# Patient Record
Sex: FEMALE | Race: WHITE
[De-identification: names, ages, dates, MRNs, and addresses within clinical notes are randomized per-mention and may not be internally consistent; named-entity substitution may affect disease eponyms.]

---

## 2017-08-02 ENCOUNTER — HOSPITAL ENCOUNTER (OUTPATIENT)
Dept: HOSPITAL 47 - RADMAMWWP | Age: 47
Discharge: HOME | End: 2017-08-02
Payer: COMMERCIAL

## 2017-08-02 DIAGNOSIS — Z12.31: Primary | ICD-10-CM

## 2017-08-02 PROCEDURE — 77063 BREAST TOMOSYNTHESIS BI: CPT

## 2017-08-03 NOTE — MM
Reason for exam: screening  (asymptomatic).

Last mammogram was performed 6 years and 1 month ago.



History:

Family history of breast cancer in mother at age 70.

Benign cyst aspiration, 2007.



Physical Findings:

A clinical breast exam by your physician is recommended on an annual basis and 

results should be correlated with mammographic findings.



MG 3D Screening Mammo W/Cad

Bilateral CC and MLO view(s) were taken.

Prior study comparison: June 24, 2011, bilateral digital screening mammo w/CAD.

The breast tissue is heterogeneously dense. This may lower the sensitivity of 

mammography.  There is a central new asymmetry in the right breast in comparison 

to the prior.





ASSESSMENT: Incomplete: need additional imaging evaluation, BI-RAD 0



RECOMMENDATION:

Special view mammogram of the right breast.





If lesion persists on supplemental views, image directed ultrasound is 

recommended.



[facility] will attempt to contact patient to return for supplemental 

views and ultrasound if indicated.

## 2017-08-10 ENCOUNTER — HOSPITAL ENCOUNTER (OUTPATIENT)
Dept: HOSPITAL 47 - RADMAMWWP | Age: 47
Discharge: HOME | End: 2017-08-10
Payer: COMMERCIAL

## 2017-08-10 DIAGNOSIS — R92.8: Primary | ICD-10-CM

## 2017-08-10 NOTE — MM
Reason for exam: additional evaluation requested from abnormal screening.

Last mammogram was performed less than 1 month ago.



History:

Family history of breast cancer in mother at age 70.

Benign cyst aspiration, 2007.



Physical Findings:

Nurse did not find any significant physical abnormalities on exam.



MG 3D Work Up W/Cad RT

ML and spot compression CC view(s) were taken of the right breast.

Prior study comparison: August 2, 2017, bilateral MG 3d screening mammo w/cad.  

June 24, 2011, bilateral digital screening mammo w/CAD.

The breast tissue is heterogeneously dense. This may lower the sensitivity of 

mammography.  No significant new findings when compared with previous films.



These results were verbally communicated with the patient and result sheet given 

to the patient on 8/10/17.





ASSESSMENT: Benign, BI-RAD 2



RECOMMENDATION:

Return to routine screening mammogram schedule for both breasts.

## 2018-12-27 ENCOUNTER — HOSPITAL ENCOUNTER (OUTPATIENT)
Dept: HOSPITAL 47 - RADMAMWWP | Age: 48
Discharge: HOME | End: 2018-12-27
Attending: FAMILY MEDICINE
Payer: COMMERCIAL

## 2018-12-27 DIAGNOSIS — Z12.31: Primary | ICD-10-CM

## 2018-12-27 PROCEDURE — 77067 SCR MAMMO BI INCL CAD: CPT

## 2018-12-27 PROCEDURE — 77063 BREAST TOMOSYNTHESIS BI: CPT

## 2018-12-31 NOTE — MM
Reason for exam: screening  (asymptomatic).

Last mammogram was performed 1 year and 5 months ago.



History:

Family history of breast cancer in mother at age 70.

Benign cyst aspiration, 2007.



Physical Findings:

A clinical breast exam by your physician is recommended on an annual basis and 

results should be correlated with mammographic findings.



MG 3D Screening Mammo W/Cad

Bilateral CC and MLO view(s) were taken.

Prior study comparison: August 10, 2017, right breast MG 3d work up w/cad RT.  

August 2, 2017, bilateral MG 3d screening mammo w/cad.

The breast tissue is heterogeneously dense. This may lower the sensitivity of 

mammography.  There is chronic nodularity in the right breast.  No significant 

changes when compared with prior studies.





ASSESSMENT: Benign, BI-RAD 2



RECOMMENDATION:

Routine screening mammogram of both breasts in 1 year.

## 2019-10-23 ENCOUNTER — HOSPITAL ENCOUNTER (OUTPATIENT)
Dept: HOSPITAL 47 - ORWHC2ENDO | Age: 49
Discharge: HOME | End: 2019-10-23
Attending: SURGERY
Payer: COMMERCIAL

## 2019-10-23 VITALS — HEART RATE: 59 BPM | DIASTOLIC BLOOD PRESSURE: 73 MMHG | SYSTOLIC BLOOD PRESSURE: 118 MMHG

## 2019-10-23 VITALS — TEMPERATURE: 97.7 F

## 2019-10-23 VITALS — RESPIRATION RATE: 16 BRPM

## 2019-10-23 DIAGNOSIS — Z88.1: ICD-10-CM

## 2019-10-23 DIAGNOSIS — Z88.0: ICD-10-CM

## 2019-10-23 DIAGNOSIS — F32.9: ICD-10-CM

## 2019-10-23 DIAGNOSIS — J30.2: ICD-10-CM

## 2019-10-23 DIAGNOSIS — Z88.2: ICD-10-CM

## 2019-10-23 DIAGNOSIS — Z12.11: Primary | ICD-10-CM

## 2019-10-23 DIAGNOSIS — Z98.890: ICD-10-CM

## 2019-10-23 DIAGNOSIS — Z79.899: ICD-10-CM

## 2019-10-23 NOTE — P.GSHP
History of Present Illness


H&P Date: 10/23/19


Chief Complaint: Colon cancer screening





48-year-old female here today for screening colonoscopy.  She has not had one 

previously.  Patient has no bowel related complaints.  No family history of 

colon cancer.





Past Medical History


Additional Past Medical History / Comment(s): SEASONAL ALLERGIES


History of Any Multi-Drug Resistant Organisms: None Reported


Past Surgical History: Hernia Repair


Additional Past Surgical History / Comment(s): LEFT HERNIA & UMBICIAL HERNIA ON 

1/23/19.  HERNIA REPAIR MANY YEARS AGO


Past Anesthesia/Blood Transfusion Reactions: No Reported Reaction, Postoperative

Nausea & Vomiting (PONV)


Additional Past Anesthesia/Blood Transfusion Reaction / Comment(s): DAD WAS 

SENSITIVE TO ANESTHESIA.


Past Psychological History: Depression


Smoking Status: Never smoker


Past Alcohol Use History: Rare


Past Drug Use History: None Reported





Medications and Allergies


                                Home Medications











 Medication  Instructions  Recorded  Confirmed  Type


 


Escitalopram Oxalate [Lexapro] 30 mg PO HS 01/21/19 10/23/19 History


 


Loratadine [Allergy] 10 mg PO HS 01/21/19 10/21/19 History








                                    Allergies











Allergy/AdvReac Type Severity Reaction Status Date / Time


 


erythromycin base Allergy  Unknown Verified 10/21/19 11:48


 


Penicillins Allergy  Unknown Verified 10/21/19 11:48


 


Sulfa (Sulfonamide Allergy  TROUBLE Verified 10/21/19 11:48





Antibiotics)   SWALLOWING  


 


sulfamethoxazole Allergy  TROUBLE Verified 10/21/19 11:48





[From Bactrim]   SWALLOWING  


 


trimethoprim [From Bactrim] Allergy  TROUBLE Verified 10/21/19 11:48





   SWALLOWING  














Surgical - Exam


                                   Vital Signs











Temp Pulse Resp BP Pulse Ox


 


 97.7 F   77   18   133/59   97 


 


 10/23/19 09:26  10/23/19 09:26  10/23/19 09:26  10/23/19 09:26  10/23/19 09:26

















Physical exam:


General: Well-developed, well-nourished


HEENT: Normocephalic, sclerae nonicteric


Abdomen: Nontender, nondistended


Extremities: No edema


Neuro: Alert and oriented





Assessment and Plan


(1) Colon cancer screening


Narrative/Plan: 


Will proceed with colonoscopy at this time.


Current Visit: Yes   Status: Acute   Code(s): Z12.11 - ENCOUNTER FOR SCREENING 

FOR MALIGNANT NEOPLASM OF COLON   SNOMED Code(s): 223129561

## 2019-10-23 NOTE — P.PCN
Date of Procedure: 10/23/19


Procedure(s) Performed: 


PREOPERATIVE DIAGNOSIS: Colon cancer screening


POSTOPERATIVE DIAGNOSIS: Normal exam


PROCEDURE: Colonoscopy 


ANESTHESIA: MAC


SURGEON: Chas Modi M.D.


SPECIMENS: None


ENDOSCOPIC PROCEDURE:  The patient was placed on the endoscopy table in the left

decubitus position.  The Olympus colonoscope was inserted into the anus and 

passed under direct visualization to the base of the cecum.  The appendiceal 

orifice was visualized.  From that point the scope was slowly withdrawn inspecti

ng all surfaces carefully.  There were no neoplastic inflammatory or polypoid 

lesions throughout the cecum, ascending, transverse, descending, sigmoid and 

rectum.  There was no visible diverticulosis noted.  Digital rectal examination 

was normal.  The patient was taken to the recovery room in stable condition per 

anesthesia guidelines.


RECOMMENDATIONS: Increase fiber.  Follow-up colonoscopy in 10 years.

## 2020-08-01 ENCOUNTER — HOSPITAL ENCOUNTER (OUTPATIENT)
Dept: HOSPITAL 47 - RADMRIMAIN | Age: 50
Discharge: HOME | End: 2020-08-01
Attending: ORTHOPAEDIC SURGERY
Payer: COMMERCIAL

## 2020-08-01 DIAGNOSIS — M94.261: Primary | ICD-10-CM

## 2020-08-01 DIAGNOSIS — M25.461: ICD-10-CM

## 2020-08-01 NOTE — MR
EXAMINATION TYPE: MR knee RT wo con

 

DATE OF EXAM: 8/1/2020 8:09 AM

 

COMPARISON: NONE

 

HISTORY: Rt knee pain

 

TECHNIQUE: Multiplanar, multiecho imaging of the right knee is performed without IV contrast.

 

FINDINGS: There is a small knee joint effusion. There is a grade I to II chondromalacia involving the
 medial patellar facet. There is a small amount of grade IV chondromalacia along the medial facet as 
well.

 

The grade I to II chondromalacia involving the weightbearing surface of the medial femoral condyle.

 

Both medial and lateral menisci appear intact.

 

The anterior and posterior cruciate ligaments are intact.

 

Both the medial and lateral collateral ligament complexes are intact. Iliotibial band inserts normall
y upon Gerdy's tubercle.

 

Both patellar and quadriceps tendons are intact. The popliteus tendon and muscle appear unremarkable.
 There is no significant swelling in the Hoffa fat pad. There is some pseudocystic change deep to the
 tibial tubercles.

 

IMPRESSION:  

1. CHONDROMALACIA AS DESCRIBED.

2. NO ACUTE LIGAMENTOUS OR MENISCAL INJURY.

3. SMALL KNEE JOINT EFFUSION.

## 2020-09-21 ENCOUNTER — HOSPITAL ENCOUNTER (OUTPATIENT)
Dept: HOSPITAL 47 - LABPAT | Age: 50
Discharge: HOME | End: 2020-09-21
Attending: ORTHOPAEDIC SURGERY
Payer: COMMERCIAL

## 2020-09-21 DIAGNOSIS — M23.91: ICD-10-CM

## 2020-09-21 DIAGNOSIS — Z01.818: Primary | ICD-10-CM

## 2020-09-21 LAB
ANION GAP SERPL CALC-SCNC: 6 MMOL/L
BASOPHILS # BLD AUTO: 0.1 K/UL (ref 0–0.2)
BASOPHILS NFR BLD AUTO: 1 %
CHLORIDE SERPL-SCNC: 104 MMOL/L (ref 98–107)
CO2 SERPL-SCNC: 27 MMOL/L (ref 22–30)
EOSINOPHIL # BLD AUTO: 0.1 K/UL (ref 0–0.7)
EOSINOPHIL NFR BLD AUTO: 1 %
ERYTHROCYTE [DISTWIDTH] IN BLOOD BY AUTOMATED COUNT: 4.63 M/UL (ref 3.8–5.4)
ERYTHROCYTE [DISTWIDTH] IN BLOOD: 13.9 % (ref 11.5–15.5)
HCT VFR BLD AUTO: 42.6 % (ref 34–46)
HGB BLD-MCNC: 13.7 GM/DL (ref 11.4–16)
LYMPHOCYTES # SPEC AUTO: 2.1 K/UL (ref 1–4.8)
LYMPHOCYTES NFR SPEC AUTO: 40 %
MCH RBC QN AUTO: 29.6 PG (ref 25–35)
MCHC RBC AUTO-ENTMCNC: 32.2 G/DL (ref 31–37)
MCV RBC AUTO: 92.1 FL (ref 80–100)
MONOCYTES # BLD AUTO: 0.3 K/UL (ref 0–1)
MONOCYTES NFR BLD AUTO: 6 %
NEUTROPHILS # BLD AUTO: 2.6 K/UL (ref 1.3–7.7)
NEUTROPHILS NFR BLD AUTO: 49 %
PLATELET # BLD AUTO: 273 K/UL (ref 150–450)
POTASSIUM SERPL-SCNC: 4.5 MMOL/L (ref 3.5–5.1)
SODIUM SERPL-SCNC: 137 MMOL/L (ref 137–145)
WBC # BLD AUTO: 5.2 K/UL (ref 3.8–10.6)

## 2020-09-21 PROCEDURE — 85025 COMPLETE CBC W/AUTO DIFF WBC: CPT

## 2020-09-21 PROCEDURE — 93005 ELECTROCARDIOGRAM TRACING: CPT

## 2020-09-21 PROCEDURE — 36415 COLL VENOUS BLD VENIPUNCTURE: CPT

## 2020-09-21 PROCEDURE — 80051 ELECTROLYTE PANEL: CPT

## 2020-09-24 NOTE — HP
HISTORY AND PHYSICAL



CHIEF COMPLAINT:

Right knee pain.



HISTORY OF PRESENT ILLNESS:

Patient is a 49-year-old  who presents with right knee pain for the

past several months.  She notes progressive pain with prolonged weightbearing.  She has

intermittent giving way.  She notes night symptoms.  She has been limping

intermittently.  She has tried medications and an injection with only partial temporary

relief.



PAST MEDICAL HISTORY:

Significant depression.



PAST SURGICAL HISTORY:

Significant for hernia repair.



CURRENT MEDICATIONS:

Lexapro.



ALLERGIES TO:

ERYTHROMYCIN, PENICILLIN, BACTRIM, and SULFA.



FAMILY HISTORY:

Significant for stroke and Alzheimer's.



SOCIAL HISTORY:

Negative for current tobacco or alcohol use.



16 POINT REVIEW OF SYSTEMS:

Otherwise reviewed and is noncontributory.



PHYSICAL EXAMINATION:

On examination, the patient is approximately 5 foot 5, 187 pounds of endomorphic

habitus.

HEENT: Exam is nonfocal.

NECK:  Supple.

She has painless passive motion of the right hip.  Straight leg raise is negative.

Active motion right knee -10 to 130 degrees of flexion.  She has a mild effusion.  She

is tender about the medial joint line.  Collaterals are stable, Lachman is negative,

Christian's elicits medial pain.  Her distal neurovascular exam appears intact in the

right right lower extremity.



MRI report right knee shows degenerative changes of all the medial and patellofemoral

compartments.  There appears to be a chondral injury involving the distal medial

femoral condyle.  There is also increased signal involving the posterior horn medial

meniscus.



IMPRESSION:

1. Internal derangement, right knee with medial femoral condyle chondral injury with

    possible posterior medial meniscal tear.

2. Right knee mild medial compartment osteoarthrosis.



RECOMMENDATIONS:

I talked to the patient at length regarding her condition along with treatment options.

At this point, she is having persistent pain and mechanical symptoms that limit her

despite conservative measures.  After thorough discussion of the options, she opts to

proceed with surgery.  We will plan to proceed with right knee arthroscopy with

possible medial femoral chondrectomy and microfracture.  We will likely perform it as

an outpatient procedure.  Risks and benefits were discussed at length in layman's

terms.





MMODL / IJN: [id number] / Job#: [id number]

## 2020-09-25 ENCOUNTER — HOSPITAL ENCOUNTER (OUTPATIENT)
Dept: HOSPITAL 47 - OR | Age: 50
Discharge: HOME | End: 2020-09-25
Attending: ORTHOPAEDIC SURGERY
Payer: COMMERCIAL

## 2020-09-25 VITALS — SYSTOLIC BLOOD PRESSURE: 118 MMHG | RESPIRATION RATE: 17 BRPM | DIASTOLIC BLOOD PRESSURE: 74 MMHG | HEART RATE: 70 BPM

## 2020-09-25 VITALS — TEMPERATURE: 97.6 F

## 2020-09-25 VITALS — BODY MASS INDEX: 31.1 KG/M2

## 2020-09-25 DIAGNOSIS — Z88.1: ICD-10-CM

## 2020-09-25 DIAGNOSIS — Z98.890: ICD-10-CM

## 2020-09-25 DIAGNOSIS — S83.241A: Primary | ICD-10-CM

## 2020-09-25 DIAGNOSIS — M17.11: ICD-10-CM

## 2020-09-25 DIAGNOSIS — Z82.3: ICD-10-CM

## 2020-09-25 DIAGNOSIS — Z87.19: ICD-10-CM

## 2020-09-25 DIAGNOSIS — Z88.0: ICD-10-CM
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
## 2020-09-25 DIAGNOSIS — Z82.0: ICD-10-CM

## 2020-09-25 DIAGNOSIS — Z79.899: ICD-10-CM

## 2020-09-25 DIAGNOSIS — F32.9: ICD-10-CM

## 2020-09-25 DIAGNOSIS — S89.81XA: ICD-10-CM

## 2020-09-25 DIAGNOSIS — Z88.2: ICD-10-CM

## 2020-09-25 PROCEDURE — 81025 URINE PREGNANCY TEST: CPT

## 2020-09-25 PROCEDURE — 29881 ARTHRS KNE SRG MNISECTMY M/L: CPT

## 2020-09-25 PROCEDURE — 29879 ARTHRS KNE SRG ABRASJ ARTHRP: CPT

## 2020-09-25 RX ADMIN — HYDROMORPHONE HYDROCHLORIDE PRN MG: 1 INJECTION, SOLUTION INTRAMUSCULAR; INTRAVENOUS; SUBCUTANEOUS at 10:22

## 2020-09-25 RX ADMIN — HYDROMORPHONE HYDROCHLORIDE PRN MG: 1 INJECTION, SOLUTION INTRAMUSCULAR; INTRAVENOUS; SUBCUTANEOUS at 10:54

## 2020-09-25 RX ADMIN — HYDROMORPHONE HYDROCHLORIDE PRN MG: 1 INJECTION, SOLUTION INTRAMUSCULAR; INTRAVENOUS; SUBCUTANEOUS at 10:45

## 2020-09-25 RX ADMIN — HYDROMORPHONE HYDROCHLORIDE PRN MG: 1 INJECTION, SOLUTION INTRAMUSCULAR; INTRAVENOUS; SUBCUTANEOUS at 10:27

## 2020-09-25 NOTE — P.OP
Date of Procedure: 09/25/20


Preoperative Diagnosis: 


Right knee internal derangement


Postoperative Diagnosis: 


Right knee posterior medial meniscal tear/grade 3 chondral injury distal medial 

femoral condyle


Procedure(s) Performed: 


Right knee arthroscopic partial medial meniscectomy/medial femoral 

chondrectomy/microfracture medial femoral condyle


Anesthesia: GEORGE


Surgeon: Joseph Emanuel


Estimated Blood Loss (ml): 10


Pathology: none sent


Condition: stable


Disposition: PACU


Indications for Procedure: 


The patient's a 49-year-old female who presents with progressive right knee pain

and mechanical symptoms despite conservative measures.  A discussion of the 

risks and benefits of operative intervention versus continued conservative 

measures was made with patient.  She opted to proceed with surgery.  Operative 

risks to include infection, neurovascular injury, development of blood clots, 

possible incomplete resolution of symptoms, possible worsening symptoms and need

for subsequent procedures were discussed.  Informed consent was obtained..


Operative Findings: 


As below


Description of Procedure: 


The patient was brought to the operating room, and after induction of general 

anesthesia examined the right knee.  Collaterals were stable, Lachman was 

negative, and posterior drawer was negative.  The right lower extremity was 

prepped and draped in a normal fashion.  A superior lateral portal was made 

through a 3 mm skin incision superior and lateral to the patella.  This was used

for outflow.  A lateral portal was made through a 5 mm vertical skin incision 

lateral to the patella tendon above the joint line.  Diagnostic arthroscopy was 

performed.  On inspection of the medial compartment, and oblique tear involving 

the posterior most aspect of the medial meniscus in the white-red junction was 

noted.  This was debrided back to stable base with straight baskets and a 

motorized shaver.  A corresponding grade 3 chondral injury was noted involving 

the posterior medial aspect of the medial femoral condyle.  There is a loose 

chondral fragment debrided back to stable base with a motorized shaver.  

Microfracture was performed utilizing a power pik breaching the subchondral 

surface down to the bone marrow elements.   On inspection of the notch, the 

anterior cruciate ligament appeared to be intact.  On inspection of the lateral 

compartment, no significant meniscal pathology was noted.    On inspection of 

the patellofemoral articulation, there was chondral fibrillation however no 

loose chondral fragments.   The gutters were clear debris.  The knee was then 

thoroughly irrigated.  The portals were closed with Steri-Strips.  A sterile 

dressing was applied in addition to a compression stocking.  The patient was 

awoken from general anesthesia and transferred to recovery room in good 

condition.  Blood loss was estimated at 10 mL.  No complications were incurred.

## 2020-09-29 NOTE — CDI
Date: 09.29.2020



CDS/ Name: Maty Montgomery

Phone: If any questions, call Grace Hernandez  at [phone]



Patient Name: Evan Boone

Account Number: [id number]

Admit Date 09.25.20   

Discharge Date: 09.25.20

   

ATTENTION: The [facility] Coding Staff appreciate your assistance in clarifying 
documentation. Please respond to the clarification below the line at the bottom 
and electronically sign. The [facility] Coding staff will review the response and 
follow-up if needed. Please note: Queries are made part of the [employer] 
Record. If you have any questions, please contact the .



Dear Dr. Emanuel



In order to code to the greatest specificity and for the greatest reimbursement 
I need the following information:



You have documented in you OP report that pt has torn meniscus and chondral 
injury.  Please specify if this is an injury or not injury related



Thank you for your kind consideration.  

_They were both injury related.  She had  a twisting injury of her knee 
previously._________________________________________________

MTDD

## 2023-01-09 ENCOUNTER — HOSPITAL ENCOUNTER (OUTPATIENT)
Dept: HOSPITAL 47 - RADCTMAIN | Age: 53
Discharge: HOME | End: 2023-01-09
Payer: COMMERCIAL

## 2023-01-09 DIAGNOSIS — H91.92: Primary | ICD-10-CM

## 2023-01-09 PROCEDURE — 70480 CT ORBIT/EAR/FOSSA W/O DYE: CPT

## 2023-01-09 NOTE — CT
EXAMINATION TYPE: CT iac wo con

 

DATE OF EXAM: 1/9/2023

 

COMPARISON: None

 

HISTORY: loss of hearing on the Lt

 

CT DLP: 262mGycm

Automated exposure control for dose reduction was used.

 

FINDINGS: The external auditory canals are patent bilaterally.  Mastoid air cells show no evidence of
 abnormal opacification bilaterally.  The middle ear ossicles are symmetric and unremarkable.  There 
is no evidence of suspicious surrounding soft tissue density to suggest cholesteatoma.  The scutum is
 preserved bilaterally.  The cochlea and the semicircular canals are symmetric and unremarkable.  Ves
tibular aqueduct and internal carotid canal appear unremarkable.  Temporomandibular joints are mainta
ined bilaterally.  Low-lying cerebellar tonsils.

 

IMPRESSION: No significant abnormality seen to account for patient's symptoms.

## 2023-06-21 ENCOUNTER — HOSPITAL ENCOUNTER (OUTPATIENT)
Dept: HOSPITAL 47 - RADBDWWP | Age: 53
Discharge: HOME | End: 2023-06-21
Attending: FAMILY MEDICINE
Payer: COMMERCIAL

## 2023-06-21 DIAGNOSIS — M79.674: ICD-10-CM

## 2023-06-21 DIAGNOSIS — Z12.31: Primary | ICD-10-CM

## 2023-06-21 DIAGNOSIS — Z82.62: ICD-10-CM

## 2023-06-21 DIAGNOSIS — Z80.3: ICD-10-CM

## 2023-06-21 PROCEDURE — 77063 BREAST TOMOSYNTHESIS BI: CPT

## 2023-06-21 PROCEDURE — 77067 SCR MAMMO BI INCL CAD: CPT

## 2023-06-21 PROCEDURE — 77080 DXA BONE DENSITY AXIAL: CPT

## 2023-06-21 NOTE — BD
EXAMINATION TYPE: Axial Bone Density

 

DATE OF EXAM: 6/21/2023

 

CLINICAL HISTORY: 52 years old Female.  ICD-10 CODE: Z82.62 FAMILY HISTORY OF OSTEOPOROSIS

 

Height:  64

Weight:  198

 

FRAX RISK QUESTIONS:

Family History (Parent hip fracture):  no

History of Fracture in Adulthood: no

Secondary Osteoporosis: no

Rheumatoid Arthritis: no

 

 

RISK FACTORS 

HISTORY OF: 

Family History of Osteoporosis: yes, mother and maternal grandma

Active: yes

Diet low in dairy products/other sources of calcium:  no

Postmenopausal woman: no

If Premenopausal, do you have irregular periods: yes

Take estrogen and/or progesterone medications: no

Lost more than 2 inches in height since high school: no

Frequent falls: no

 

MEDICATIONS: 

Additional Medications: yes 

anxiety meds 

 

 

 

EXAM MEASUREMENTS: 

Bone mineral densitometry was performed using the [employer].

Bone mineral density as measured about the Lumbar spine is:  

----- L1-L4(G/cm2): 1.405

T Score Values are as follows:

----- L1: 0.5

----- L2: 0.4

----- L3: 0.9

----- L4: 4.7

----- L1-L4: 1.9

 

Z Score Values are as follows:

----- L1: 0.4

----- L2: 0.4

----- L3: 0.9

----- L4: 4.7

----- L1-L4: 1.8

 

Bone mineral density baseline

 

Bone mineral density about the R hip (g/cm2): 1.021

Bone mineral density about the L hip (g/cm2): 1.040

T Score values are as follows:

-----R Neck: -0.7

-----L Neck: -*0.3

-----R Total: 0.1

-----L Total: 0.3

 

Z Score values are as follows:

-----R Neck: -0.2

-----L Neck: 0.1

-----R Total: 0.1

-----L Total: 0.3

 

Bone mineral density baseline

 

 

 

FRAX%s: The graph provided illustrates a 5.0% chance for a major osteoporotic fx and a 0.2% chance fo
r the hips probability for fx in 10 years time.

 

 

 

 

IMPRESSION:

Normal (Values between +1 and -1 indicate normal bone mass).  Consider repeating this study in 5 year
s or sooner if there is some new clinical indication.

 

NOTE:  T-SCORE=SD OF THE YOUNG ADULT MEAN.

## 2023-06-21 NOTE — XR
EXAMINATION TYPE: XR foot complete RT

 

DATE OF EXAM: 6/21/2023

 

COMPARISON: None

 

HISTORY: Pain right toe

 

TECHNIQUE: 3 view right foot

 

FINDINGS: No acute fracture or dislocation. Joint spaces are preserved. Soft tissues appear normal.

 

Follow up exams can be performed 7-10 days from acute trauma for continued pain.

 

IMPRESSION:

1.  No acute osseous abnormality right foot

## 2023-06-22 NOTE — MM
Reason for Exam: Screening  (asymptomatic). 

Last mammogram was performed 1 year(s) and 2 month(s) ago. 





Patient History: 

Menarche at age 11. First Full-Term Pregnancy at age 26. Patient has history of breast feeding.

Benign Cyst Aspiration.

Mother had breast cancer, age 70.

Last menstrual period: 05/10/2023





Risk Values: 

Aracely 5 year model risk: 2.2%.

NCI Lifetime model risk: 15.8%.





Prior Study Comparison: 

8/10/2017 Right Diagnostic Mammogram, [facility]. 12/27/2018 Bilateral Screening Mammogram, [facility]. 4/6/2022

Bilateral Screening Mammogram, [facility]. 





Tissue Density: 

The breast tissue is heterogeneously dense. This may lower the sensitivity of mammography.





Findings: 

Analyzed By CAD. 

Chronic nodularity on the right.

There is no suspicious group of microcalcifications or new suspicious mass in either breast. 





Overall Assessment: Benign, BI-RAD 2





Management: 

Screening Mammogram of both breasts in 1 year.

.



Patient should continue monthly self-breast exams.  A clinical breast exam by your physician is

recommended on an annual basis.

This exam should not preclude additional follow-up of suspicious palpable abnormalities.



Note on Aracely scores and lifetime risk:

1. A Aracely score greater than 3% is considered moderate risk. If this is the case, consider

specialist referral to assess eligibility for a risk reducing agent.

2. If overall lifetime risk for the development of breast cancer is 20% or higher, the patient may

qualify for future screening with alternating mammogram and breast MRI.



Electronically signed and approved by: Sean Barahona M.D. Radiologist

## 2025-03-25 ENCOUNTER — HOSPITAL ENCOUNTER (OUTPATIENT)
Dept: HOSPITAL 47 - RADMAMWWP | Age: 55
Discharge: HOME | End: 2025-03-25
Attending: FAMILY MEDICINE
Payer: COMMERCIAL

## 2025-03-25 DIAGNOSIS — Z80.3: ICD-10-CM

## 2025-03-25 DIAGNOSIS — Z78.0: ICD-10-CM

## 2025-03-25 DIAGNOSIS — Z12.31: Primary | ICD-10-CM

## 2025-03-25 DIAGNOSIS — R92.333: ICD-10-CM

## 2025-03-25 PROCEDURE — 77063 BREAST TOMOSYNTHESIS BI: CPT

## 2025-03-25 PROCEDURE — 77067 SCR MAMMO BI INCL CAD: CPT

## 2025-03-25 NOTE — MM
Reason for Exam: Screening  (asymptomatic). 

Last mammogram was performed 1 year(s) and 9 month(s) ago. 





Patient History: 

Menarche at age 11. First Full-Term Pregnancy at age 26. Postmenopausal. Patient has history of

breast feeding. Benign Cyst Aspiration.

Mother had breast cancer, age 70. 





Risk Values: 

Aracely 5 year model risk: 2.4%.

NCI Lifetime model risk: 15.2%.





Prior Study Comparison: 

12/27/2018 Bilateral Screening Mammogram, [facility]. 4/6/2022 Bilateral Screening Mammogram, [facility].

6/21/2023 Bilateral MG 3D screening mammo w/cad, [facility]. 





Tissue Density: 

The breasts are heterogeneously dense, which may obscure small masses.





Findings: 

Analyzed By CAD. 

There is no suspicious group of microcalcifications or new suspicious mass in either breast. Chronic

nodularity right breast stable. Benign calcifications. 





Overall Assessment: Benign, BI-RAD 2





Management: 

Screening Mammogram of both breasts in 1 year.

.



Patient should continue monthly self-breast exams.  A clinical breast exam by your physician is

recommended on an annual basis.

This exam should not preclude additional follow-up of suspicious palpable abnormalities.



Note on Aracely scores and lifetime risk:

1. A Aracely score greater than 3% is considered moderate risk. If this is the case, consider

specialist referral to assess eligibility for a risk reducing agent.

2. If overall lifetime risk for the development of breast cancer is 20% or higher, the patient may

qualify for future screening with alternating mammogram and breast MRI.









[facility], Workstation: [id number], 3/25/2025 8:39 AM.



Electronically signed and approved by: Andrés Livingston M.D. Radiologis

## 2025-07-11 ENCOUNTER — HOSPITAL ENCOUNTER (OUTPATIENT)
Dept: HOSPITAL 47 - RADMRIMAIN | Age: 55
Discharge: HOME | End: 2025-07-11
Attending: ORTHOPAEDIC SURGERY
Payer: COMMERCIAL

## 2025-07-11 DIAGNOSIS — M67.864: ICD-10-CM

## 2025-07-11 DIAGNOSIS — M22.2X2: Primary | ICD-10-CM

## 2025-07-11 DIAGNOSIS — R60.0: ICD-10-CM
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
